# Patient Record
Sex: FEMALE | Race: ASIAN | NOT HISPANIC OR LATINO | URBAN - METROPOLITAN AREA
[De-identification: names, ages, dates, MRNs, and addresses within clinical notes are randomized per-mention and may not be internally consistent; named-entity substitution may affect disease eponyms.]

---

## 2017-03-31 ENCOUNTER — EMERGENCY (EMERGENCY)
Facility: HOSPITAL | Age: 22
LOS: 1 days | Discharge: HOME | End: 2017-03-31
Attending: EMERGENCY MEDICINE | Admitting: EMERGENCY MEDICINE
Payer: COMMERCIAL

## 2017-03-31 VITALS
DIASTOLIC BLOOD PRESSURE: 72 MMHG | HEART RATE: 79 BPM | SYSTOLIC BLOOD PRESSURE: 117 MMHG | OXYGEN SATURATION: 100 % | RESPIRATION RATE: 16 BRPM | TEMPERATURE: 99 F

## 2017-03-31 VITALS
SYSTOLIC BLOOD PRESSURE: 106 MMHG | OXYGEN SATURATION: 100 % | DIASTOLIC BLOOD PRESSURE: 65 MMHG | HEART RATE: 89 BPM | TEMPERATURE: 98 F | RESPIRATION RATE: 16 BRPM

## 2017-03-31 DIAGNOSIS — Z33.2 ENCOUNTER FOR ELECTIVE TERMINATION OF PREGNANCY: ICD-10-CM

## 2017-03-31 PROCEDURE — 99282 EMERGENCY DEPT VISIT SF MDM: CPT

## 2017-03-31 RX ORDER — ONDANSETRON 8 MG/1
4 TABLET, FILM COATED ORAL ONCE
Qty: 0 | Refills: 0 | Status: DISCONTINUED | OUTPATIENT
Start: 2017-03-31 | End: 2017-04-04

## 2017-03-31 NOTE — ED ADULT TRIAGE NOTE - CHIEF COMPLAINT QUOTE
Patient states she took mifeprex which was prescribed by a [facility] clinic and she wants to know if it can be reversed.  Denies abd pain.  Denies n/v/d/f.  No complaints.  Denies vaginal bleeding.

## 2017-03-31 NOTE — ED PROVIDER NOTE - MEDICAL DECISION MAKING DETAILS
s/p misoprostol for elective TOP, now with second thoughts.  discussed with patient that misoprostol cannot be reversed. explained that misoprostol is not guaranteed to work and that she will need to discuss further with her gynecologist.  I emphasized that she is still young enough to have future pregnancies.  She agrees to discuss with her GYN tomorrow.  No SI/HI/AH/VH. s/p misoprostol for elective TOP, now with second thoughts.  discussed with patient that misoprostol cannot be reversed. explained that misoprostol is not guaranteed to work and that she will need to discuss further with her gynecologist.  I emphasized that she is still young enough to have future pregnancies.  She agrees to discuss with her GYN tomorrow.

## 2017-03-31 NOTE — ED PROVIDER NOTE - OBJECTIVE STATEMENT
pt is 9 wks pregnant with first pregnancy. Had misoprostol yesterday for TOP, but today is having second thoughts.  She came to ED to ask if medication can be reversed. she had some cramping last night, but none today, and is not having any bleeding.  no fever or chills but mild nausea.  no abdominal pain.

## 2017-03-31 NOTE — ED PROVIDER NOTE - PHYSICAL EXAMINATION
GEN: awake, alert, NAD  HEAD: NC/AT  EYES: Clear, Pupils equal and round.  ENT: Airway patent; no rhinorrhea  PULM: Lungs clear  CV: RRR S1S2  GI: Abd soft, nontender  MSK: GUTIERREZ without difficulty  SKIN: normal color and turgor, no rash  NEURO: Alert, oriented. No gross abnormalities.  PSYCH: Says she is a little sad but understands that there is a chance that the misoprostol may not have aborted the pregnancy, and that she is young and could become pregnant again for years to come.  Denies SI/HI/AH/VH.  Does not appear to be at risk of harm to self.

## 2017-07-11 ENCOUNTER — EMERGENCY (EMERGENCY)
Facility: HOSPITAL | Age: 22
LOS: 1 days | Discharge: HOME | End: 2017-07-11
Attending: EMERGENCY MEDICINE | Admitting: EMERGENCY MEDICINE
Payer: COMMERCIAL

## 2017-07-11 PROCEDURE — 93010 ELECTROCARDIOGRAM REPORT: CPT

## 2017-07-11 PROCEDURE — 99284 EMERGENCY DEPT VISIT MOD MDM: CPT | Mod: 25

## 2017-07-12 VITALS
SYSTOLIC BLOOD PRESSURE: 117 MMHG | TEMPERATURE: 98 F | OXYGEN SATURATION: 100 % | RESPIRATION RATE: 16 BRPM | HEART RATE: 75 BPM | DIASTOLIC BLOOD PRESSURE: 67 MMHG

## 2017-07-12 VITALS
HEART RATE: 64 BPM | DIASTOLIC BLOOD PRESSURE: 67 MMHG | TEMPERATURE: 98 F | RESPIRATION RATE: 14 BRPM | SYSTOLIC BLOOD PRESSURE: 117 MMHG

## 2017-07-12 NOTE — ED ADULT TRIAGE NOTE - CHIEF COMPLAINT QUOTE
Pt c/o dizziness, lightheadedness and palpitations. Was seen at [facility] this afternoon with same complaints. Per pt's mother, pt has hx of depression and anxiety and is not sure if it is related.

## 2017-07-12 NOTE — ED PROVIDER NOTE - ATTENDING CONTRIBUTION TO CARE
pt with palps and anxiety at home.  Already seen at one OSH.  Has been on meds before for psych and anxiety such as this.  No longer on those meds.  Here with a unremarkable EKG without arrythmia.  Not consistent with an ACS.  PE in ED is normal from CV and resp standpoint

## 2017-07-12 NOTE — ED ADULT NURSE NOTE - OBJECTIVE STATEMENT
Patient received in room #23 c/o vasal vagal episode. A&OX3. Patient denies hitting her head. Patient reports she was seen in [facility] and was told all test results were negative. Patient c/o weakness and palpitations. Patient reports hx. anxiety. Denies SI/HI. Patient appears in nad, respirations even and unlabored. Vs as noted. Will monitor.

## 2017-07-12 NOTE — ED PROVIDER NOTE - OBJECTIVE STATEMENT
22 year old female with history of anxiety and depression, in with lightheadedness x 1 day, palpitations x 1 week, described as going fast. 22 year old female with history of anxiety and depression, in with lightheadedness x 1 day, palpitations x 1 week, described as heartrate going fast. Seen earlier in the day at [facility] ED, had en ekg done that was normal, sent home.  States she was going to bed and again noted the symptoms.  Normally able to control these symptoms when she relaxes, but was unable to tonite so came back to the ED.

## 2017-07-12 NOTE — ED PROVIDER NOTE - MEDICAL DECISION MAKING DETAILS
22F in with palpitations, lightheadedness, likely anxiety, no need for labs, ekg normal, will dc home with followup and recommend psychiatry evaluation

## 2017-07-12 NOTE — ED ADULT NURSE NOTE - CHIEF COMPLAINT QUOTE
Pt c/o dizziness, lightheadedness and palpitations. Was seen at [facility] this afternoon with same complaints. Per pt's mother, pt has hx of depression and anxiety and is not sure if it is related.

## 2017-08-06 ENCOUNTER — EMERGENCY (EMERGENCY)
Facility: HOSPITAL | Age: 22
LOS: 1 days | Discharge: HOME | End: 2017-08-06
Attending: EMERGENCY MEDICINE | Admitting: EMERGENCY MEDICINE
Payer: COMMERCIAL

## 2017-08-06 VITALS
OXYGEN SATURATION: 100 % | TEMPERATURE: 99 F | SYSTOLIC BLOOD PRESSURE: 102 MMHG | RESPIRATION RATE: 16 BRPM | DIASTOLIC BLOOD PRESSURE: 61 MMHG | HEART RATE: 72 BPM

## 2017-08-06 LAB
ALBUMIN SERPL ELPH-MCNC: 4.9 G/DL — SIGNIFICANT CHANGE UP (ref 3.3–5)
ALP SERPL-CCNC: 62 U/L — SIGNIFICANT CHANGE UP (ref 40–120)
ALT FLD-CCNC: 11 U/L — SIGNIFICANT CHANGE UP (ref 4–33)
APPEARANCE UR: CLEAR — SIGNIFICANT CHANGE UP
AST SERPL-CCNC: 17 U/L — SIGNIFICANT CHANGE UP (ref 4–32)
BASOPHILS # BLD AUTO: 0.07 K/UL — SIGNIFICANT CHANGE UP (ref 0–0.2)
BASOPHILS NFR BLD AUTO: 0.6 % — SIGNIFICANT CHANGE UP (ref 0–2)
BILIRUB SERPL-MCNC: 0.2 MG/DL — SIGNIFICANT CHANGE UP (ref 0.2–1.2)
BILIRUB UR-MCNC: NEGATIVE — SIGNIFICANT CHANGE UP
BLOOD UR QL VISUAL: NEGATIVE — SIGNIFICANT CHANGE UP
BUN SERPL-MCNC: 10 MG/DL — SIGNIFICANT CHANGE UP (ref 7–23)
CALCIUM SERPL-MCNC: 9.8 MG/DL — SIGNIFICANT CHANGE UP (ref 8.4–10.5)
CHLORIDE SERPL-SCNC: 103 MMOL/L — SIGNIFICANT CHANGE UP (ref 98–107)
CO2 SERPL-SCNC: 23 MMOL/L — SIGNIFICANT CHANGE UP (ref 22–31)
COLOR SPEC: YELLOW — SIGNIFICANT CHANGE UP
CREAT SERPL-MCNC: 0.75 MG/DL — SIGNIFICANT CHANGE UP (ref 0.5–1.3)
EOSINOPHIL # BLD AUTO: 0.38 K/UL — SIGNIFICANT CHANGE UP (ref 0–0.5)
EOSINOPHIL NFR BLD AUTO: 3.4 % — SIGNIFICANT CHANGE UP (ref 0–6)
GLUCOSE SERPL-MCNC: 85 MG/DL — SIGNIFICANT CHANGE UP (ref 70–99)
GLUCOSE UR-MCNC: NEGATIVE — SIGNIFICANT CHANGE UP
HCT VFR BLD CALC: 45.6 % — HIGH (ref 34.5–45)
HGB BLD-MCNC: 14.7 G/DL — SIGNIFICANT CHANGE UP (ref 11.5–15.5)
IMM GRANULOCYTES # BLD AUTO: 0.02 # — SIGNIFICANT CHANGE UP
IMM GRANULOCYTES NFR BLD AUTO: 0.2 % — SIGNIFICANT CHANGE UP (ref 0–1.5)
KETONES UR-MCNC: NEGATIVE — SIGNIFICANT CHANGE UP
LEUKOCYTE ESTERASE UR-ACNC: NEGATIVE — SIGNIFICANT CHANGE UP
LYMPHOCYTES # BLD AUTO: 4.52 K/UL — HIGH (ref 1–3.3)
LYMPHOCYTES # BLD AUTO: 40.5 % — SIGNIFICANT CHANGE UP (ref 13–44)
MCHC RBC-ENTMCNC: 28 PG — SIGNIFICANT CHANGE UP (ref 27–34)
MCHC RBC-ENTMCNC: 32.2 % — SIGNIFICANT CHANGE UP (ref 32–36)
MCV RBC AUTO: 86.9 FL — SIGNIFICANT CHANGE UP (ref 80–100)
MONOCYTES # BLD AUTO: 0.65 K/UL — SIGNIFICANT CHANGE UP (ref 0–0.9)
MONOCYTES NFR BLD AUTO: 5.8 % — SIGNIFICANT CHANGE UP (ref 2–14)
MUCOUS THREADS # UR AUTO: SIGNIFICANT CHANGE UP
NEUTROPHILS # BLD AUTO: 5.51 K/UL — SIGNIFICANT CHANGE UP (ref 1.8–7.4)
NEUTROPHILS NFR BLD AUTO: 49.5 % — SIGNIFICANT CHANGE UP (ref 43–77)
NITRITE UR-MCNC: NEGATIVE — SIGNIFICANT CHANGE UP
NRBC # FLD: 0 — SIGNIFICANT CHANGE UP
PH UR: 5.5 — SIGNIFICANT CHANGE UP (ref 4.6–8)
PLATELET # BLD AUTO: 310 K/UL — SIGNIFICANT CHANGE UP (ref 150–400)
PMV BLD: 10.5 FL — SIGNIFICANT CHANGE UP (ref 7–13)
POTASSIUM SERPL-MCNC: 4.1 MMOL/L — SIGNIFICANT CHANGE UP (ref 3.5–5.3)
POTASSIUM SERPL-SCNC: 4.1 MMOL/L — SIGNIFICANT CHANGE UP (ref 3.5–5.3)
PROT SERPL-MCNC: 8 G/DL — SIGNIFICANT CHANGE UP (ref 6–8.3)
PROT UR-MCNC: NEGATIVE — SIGNIFICANT CHANGE UP
RBC # BLD: 5.25 M/UL — HIGH (ref 3.8–5.2)
RBC # FLD: 12.7 % — SIGNIFICANT CHANGE UP (ref 10.3–14.5)
RBC CASTS # UR COMP ASSIST: SIGNIFICANT CHANGE UP (ref 0–?)
SODIUM SERPL-SCNC: 142 MMOL/L — SIGNIFICANT CHANGE UP (ref 135–145)
SP GR SPEC: 1.02 — SIGNIFICANT CHANGE UP (ref 1–1.03)
SQUAMOUS # UR AUTO: SIGNIFICANT CHANGE UP
TSH SERPL-MCNC: 4.15 UIU/ML — SIGNIFICANT CHANGE UP (ref 0.27–4.2)
UROBILINOGEN FLD QL: NORMAL E.U. — SIGNIFICANT CHANGE UP (ref 0.1–0.2)
WBC # BLD: 11.15 K/UL — HIGH (ref 3.8–10.5)
WBC # FLD AUTO: 11.15 K/UL — HIGH (ref 3.8–10.5)
WBC UR QL: SIGNIFICANT CHANGE UP (ref 0–?)

## 2017-08-06 PROCEDURE — 99283 EMERGENCY DEPT VISIT LOW MDM: CPT | Mod: 25

## 2017-08-06 PROCEDURE — 93010 ELECTROCARDIOGRAM REPORT: CPT | Mod: 59

## 2017-08-06 NOTE — ED PROVIDER NOTE - OBJECTIVE STATEMENT
Pt is a 21 y/o F presenting to the ED with c/o intermittent palpitations over the last month. Pt reports having these episodes of palpitations associated with near syncope. She notes that she had an elective  in 2017 and admits her sxs began shortly after that. Additionally reports to multiple stressors in her life. Pt was seen at [facility] ED regarding the same sxs and dx'd with anxiety induced vasovagal syncope. She reports that today, her episode of palpitations persisted longer than usual, prompting her visit to the ED today. Pt admits that she is so fixated on the reoccurrence of sxs that it interferes with her ADLs. Mother states the sxs have become so severe, the pt no longer sleeps alone and has to sleep in the company of her. She has a normal appetite. Denies AH/VH, SI/HI, CP, SOB, calf swelling, and any other sxs. Pt admits that she is very hypersensitive about her bodily sxs and she is consistently worried that her physical sxs are not linked to anxiety and she is missing a dx. Denies drug use. She is a former smoker. Pt is a 21 y/o F presenting to the ED with c/o intermittent palpitations over the last month. Pt reports having these episodes of palpitations associated with near syncope. She notes that she had an elective  in 2017 and admits her sxs began shortly after that. Additionally reports to multiple stressors in her life. Pt was seen at [facility] ED regarding the same sxs and dx'd with anxiety induced vasovagal syncope. She reports that today, her episode of palpitations persisted longer than usual, prompting her visit to the ED today. Pt admits that she is so fixated on the reoccurrence of sxs that it interferes with her ADLs. Mother states the sxs have become so severe, the pt no longer sleeps alone and has to sleep in the company of her. She has a normal appetite. Denies AH/VH, SI/HI, CP, SOB, calf swelling, and any other sxs. Pt admits that she is very hypersensitive about her bodily sxs and she is consistently worried that her physical sxs are not linked to anxiety and she is missing a dx. Denies drug use. She is a former smoker.  Attending - Agree with above.  I evaluated patient myself.  21 y/o F with mother at bedside, reports hx of anxiety and depression, on Zoloft and Buspirone in past but stopped approx 1 year ago because prefers not to take medications.  Had not returned to psychiatrist.  Reports for past month has been having panic attack episodes where she feels palpitations, nausea, and impending sense of doom.  Has been sleeping with mother for fear of return.  To ED , [facility] returned.  Currently feels well, but concerned will return.  Denies AH/VH.  Denies SI/HI.  Mother concerned that symptoms from anemia as patient has had in past from heavy menses. Pt is a 21 y/o F presenting to the ED with c/o intermittent palpitations over the last month. Pt reports having these episodes of palpitations associated with near syncope. She notes that she had an elective  in 2017 and admits her sxs began shortly after that. Additionally reports to multiple stressors in her life. Pt was seen at [facility] ED regarding the same sxs and dx'd with anxiety induced vasovagal syncope. She reports that today, her episode of palpitations persisted longer than usual, prompting her visit to the ED today. Pt admits that she is so fixated on the reoccurrence of sxs that it interferes with her ADLs. Mother states the sxs have become so severe, the pt no longer sleeps alone and has to sleep in the company of her mother. She has a normal appetite. Denies AH/VH, SI/HI, CP, SOB, calf swelling, and any other sxs. Pt admits that she is very hypersensitive about her bodily sxs and she is consistently worried that her physical sxs are not linked to anxiety and she is missing a dx. Denies drug use. She is a former smoker.  Attending - Agree with above.  I evaluated patient myself.  21 y/o F with mother at bedside, reports hx of anxiety and depression, on Zoloft and Buspirone in past but stopped approx 1 year ago because prefers not to take medications.  Had not returned to psychiatrist.  Reports for past month has been having panic attack episodes where she feels palpitations, nausea, and impending sense of doom.  Has been sleeping with mother for fear of return.  To ED , [facility] returned.  Currently feels well, but concerned will return.  Denies AH/VH.  Denies SI/HI.  Mother concerned that symptoms from anemia as patient has had in past from heavy menses.

## 2017-08-06 NOTE — ED ADULT NURSE NOTE - CHIEF COMPLAINT QUOTE
onset ~ 1month.  h/o anxiety but per pt, feels different. felt heart pounding , felt dizzy, hands cold.  no meds. last episode ~ 6pm.  spoke to psyche: to have ekg and speak to psyche to start. pt denies homicidal/suicidal ideations

## 2017-08-06 NOTE — ED ADULT TRIAGE NOTE - CHIEF COMPLAINT QUOTE
onset ~ 1month.  h/o anxiety but per pt, feels different. felt heart pounding , felt dizzy, hands cold.  no meds. last episode ~ 6pm onset ~ 1month.  h/o anxiety but per pt, feels different. felt heart pounding , felt dizzy, hands cold.  no meds. last episode ~ 6pm.  spoke to psyche: to have ekg and speak to psyche to start. pt denies homicidal/suicidal ideations

## 2017-08-06 NOTE — ED PROVIDER NOTE - MEDICAL DECISION MAKING DETAILS
21 yo F with episodes of palpitations and near syncope. Has been diagnosed with vasovagal syncope in the past but unknown if related to anxiety. No SI/HI. Plan to obtain EKG and labs to r/o anemia vs hypothyroidism. Will call psych when labs are wnl.

## 2017-08-06 NOTE — ED PROVIDER NOTE - PLAN OF CARE
See your primary care doctor within 24-48 hours. See your psychiatrist this week for further evaluation, bring copies of all reports with you. Take Xanax 1 tab every 6-8 hours as needed for anxiety, caution no driving causes drowsiness. Return to the ER for worsening symptoms or any other concerns.

## 2017-08-06 NOTE — ED PROVIDER NOTE - PHYSICAL EXAMINATION
ATTENDING PHYSICAL EXAM DR. Page ***GEN - NAD; well appearing; A+O x3 ***HEAD - NC/AT ***EYES/NOSE - PERRL, EOMI, mucous membranes moist, no discharge ***THROAT: Oral cavity and pharynx normal. No inflammation, swelling, exudate, or lesions.  ***NECK: Neck supple, non-tender without lymphadenopathy, no masses, no JVD.   ***PULMONARY - CTA b/l, symmetric breath sounds. ***CARDIAC -s1s2, RRR, no M,R,G  ***ABDOMEN - +BS, ND, NT, soft, no guarding, no rebound, no masses   ***BACK - no CVA tenderness, Normal  spine ***EXTREMITIES - symmetric pulses, 2+ dp, capillary refill < 2 seconds, no clubbing, no cyanosis, no edema ***SKIN - no rash or bruising   ***NEUROLOGIC - alert, CN 2-12 intact, reflexes nl, sensation nl, coordination nl, finger to nose nl, romberg negative, motor 5/5 RUE/LUE/RLE/LLE/EHL/Plantar flexion, no pronator drift, gait nl, ***PSYCH - insight and judgment nl, memory nl, affect nl, thought nl

## 2017-08-06 NOTE — ED ADULT NURSE NOTE - OBJECTIVE STATEMENT
Pt 22y female presents to ED c/o feeling different and palpitations, pt has hx of anxiety, and appt to see pmd this week. Pt aaox3 and ambulatory. Pt appears anxious, NAD observed, denies pain, pt resting on stretcher with family at bedside. Pt denies chest pain but feels "heart racing", pt denies sob, fever, chills, ha, n/v/d, abd pain.

## 2017-08-06 NOTE — ED PROVIDER NOTE - CARE PLAN
Principal Discharge DX:	Panic attack  Instructions for follow-up, activity and diet:	See your primary care doctor within 24-48 hours. See your psychiatrist this week for further evaluation, bring copies of all reports with you. Take Xanax 1 tab every 6-8 hours as needed for anxiety, caution no driving causes drowsiness. Return to the ER for worsening symptoms or any other concerns.

## 2017-08-06 NOTE — ED PROVIDER NOTE - PROGRESS NOTE DETAILS
JUAN Erickson: Labs wnl, pt seeing her PMD tomorrow. Has a psychiatrist to f/u w/ for further management. No need for psych eval now, no SI/HI.

## 2017-08-07 RX ORDER — ALPRAZOLAM 0.25 MG
1 TABLET ORAL
Qty: 9 | Refills: 0 | OUTPATIENT
Start: 2017-08-07 | End: 2017-08-10

## 2017-08-09 ENCOUNTER — EMERGENCY (EMERGENCY)
Facility: HOSPITAL | Age: 22
LOS: 1 days | Discharge: HOME | End: 2017-08-09
Attending: EMERGENCY MEDICINE | Admitting: EMERGENCY MEDICINE
Payer: COMMERCIAL

## 2017-08-09 VITALS
HEART RATE: 82 BPM | OXYGEN SATURATION: 100 % | DIASTOLIC BLOOD PRESSURE: 68 MMHG | TEMPERATURE: 99 F | SYSTOLIC BLOOD PRESSURE: 100 MMHG | RESPIRATION RATE: 16 BRPM

## 2017-08-09 PROCEDURE — 99283 EMERGENCY DEPT VISIT LOW MDM: CPT

## 2017-08-09 RX ORDER — FAMOTIDINE 10 MG/ML
1 INJECTION INTRAVENOUS
Qty: 28 | Refills: 0 | OUTPATIENT
Start: 2017-08-09 | End: 2017-08-23

## 2017-08-09 NOTE — ED PROVIDER NOTE - MEDICAL DECISION MAKING DETAILS
pt with sxs of mild gastroenteritis  abd exam unremarkable  RUQ US performed (resident felt tenderness there) negative  pain improved with pepcid

## 2017-08-09 NOTE — ED PROVIDER NOTE - OBJECTIVE STATEMENT
pt presenting with diarrhea (soft stools) vomiting x 1 epigastric pain worse after eating  also noted on empty stomah  also mild left sided abd pain  no radiation  no fever  no urinary sxs  no recent travel  no new medications  sx occurring over 1 week pt presenting with diarrhea (soft stools) vomiting x 1 epigastric pain worse after eating  also noted on empty stomach  also mild left sided abd pain  no radiation  no fever  no urinary sxs  no recent travel  no new medications  sx occurring over 1 week

## 2017-08-10 ENCOUNTER — EMERGENCY (EMERGENCY)
Facility: HOSPITAL | Age: 22
LOS: 1 days | Discharge: HOME | End: 2017-08-10
Admitting: EMERGENCY MEDICINE
Payer: COMMERCIAL

## 2017-08-10 VITALS
SYSTOLIC BLOOD PRESSURE: 126 MMHG | TEMPERATURE: 98 F | HEART RATE: 69 BPM | DIASTOLIC BLOOD PRESSURE: 72 MMHG | RESPIRATION RATE: 15 BRPM | OXYGEN SATURATION: 100 %

## 2017-08-10 PROCEDURE — 99283 EMERGENCY DEPT VISIT LOW MDM: CPT

## 2017-08-10 NOTE — ED ADULT TRIAGE NOTE - CHIEF COMPLAINT QUOTE
Pt c/o panic attack this evening at around 8pm.  Pt reports her body is now relaxed, but still feels like her "mind is racing."  Spoke with  NP, pt to be seen in .

## 2017-08-10 NOTE — ED PROVIDER NOTE - OBJECTIVE STATEMENT
23 y/o F hx Anxiety Disorder, Depression  Denies falling,  punching or kicking any objects. Denies pain, SOB, fever, chills, chest/abdominal discomfort. Denies SIHI/AH/VH. Denies use alcohol or illicit drugs. 23 y/o F hx Anxiety Disorder, Depression presents  to ER w c/o "intermittent feelings of anxiousness". States that she's compliant with  medication that she started 1 week ago. Denies falling,  punching or kicking any objects. Denies pain, SOB, fever, chills, chest/abdominal discomfort. Denies SI/HI/AH/VH. Denies use alcohol or illicit drugs.  LNMP - 7/2017

## 2017-08-10 NOTE — ED ADULT NURSE NOTE - OBJECTIVE STATEMENT
BIb mom from home for anxious feelings. Pt d/c from ed for similar presentation with xanax rx. Pt is awake, a&ox3, denies s/i h/i or a/v/h. Pt reports ongoing anxious feelings since d/c and has not taken xanax because she is "afraid it is addicting".

## 2017-08-10 NOTE — ED PROVIDER NOTE - MEDICAL DECISION MAKING DETAILS
21 y/o F hx Anxiety Disorder, Depression  Medical evaluation performed. There is no clinical evidence of intoxication or any acute medical problem requiring immediate intervention. Patient is awaiting psychiatric consultation. Final disposition will be determined by psychiatrist. 21 y/o F hx Anxiety Disorder, Depression  Medical evaluation performed. There is no clinical evidence of intoxication or any acute medical problem requiring immediate intervention.  Referral to [facility] provided.

## 2017-08-11 ENCOUNTER — OUTPATIENT (OUTPATIENT)
Dept: OUTPATIENT SERVICES | Facility: HOSPITAL | Age: 22
LOS: 1 days | Discharge: STILL ADMITTED OR ADMINISTRATIVE | End: 2017-08-11
Payer: COMMERCIAL

## 2017-08-11 PROCEDURE — 90792 PSYCH DIAG EVAL W/MED SRVCS: CPT

## 2017-08-14 DIAGNOSIS — F41.0 PANIC DISORDER [EPISODIC PAROXYSMAL ANXIETY]: ICD-10-CM

## 2019-06-02 ENCOUNTER — EMERGENCY (EMERGENCY)
Facility: HOSPITAL | Age: 24
LOS: 1 days | Discharge: HOME | End: 2019-06-02
Attending: EMERGENCY MEDICINE | Admitting: EMERGENCY MEDICINE
Payer: COMMERCIAL

## 2019-06-02 VITALS
OXYGEN SATURATION: 97 % | RESPIRATION RATE: 16 BRPM | SYSTOLIC BLOOD PRESSURE: 106 MMHG | DIASTOLIC BLOOD PRESSURE: 73 MMHG | TEMPERATURE: 98 F | HEART RATE: 77 BPM

## 2019-06-02 LAB
ALBUMIN SERPL ELPH-MCNC: 5 G/DL — SIGNIFICANT CHANGE UP (ref 3.3–5)
ALP SERPL-CCNC: 43 U/L — SIGNIFICANT CHANGE UP (ref 40–120)
ALT FLD-CCNC: 11 U/L — SIGNIFICANT CHANGE UP (ref 4–33)
ANION GAP SERPL CALC-SCNC: 14 MMO/L — SIGNIFICANT CHANGE UP (ref 7–14)
APPEARANCE UR: CLEAR — SIGNIFICANT CHANGE UP
AST SERPL-CCNC: 24 U/L — SIGNIFICANT CHANGE UP (ref 4–32)
BASOPHILS # BLD AUTO: 0.06 K/UL — SIGNIFICANT CHANGE UP (ref 0–0.2)
BASOPHILS NFR BLD AUTO: 0.7 % — SIGNIFICANT CHANGE UP (ref 0–2)
BILIRUB SERPL-MCNC: 0.5 MG/DL — SIGNIFICANT CHANGE UP (ref 0.2–1.2)
BILIRUB UR-MCNC: NEGATIVE — SIGNIFICANT CHANGE UP
BLOOD UR QL VISUAL: NEGATIVE — SIGNIFICANT CHANGE UP
BUN SERPL-MCNC: 7 MG/DL — SIGNIFICANT CHANGE UP (ref 7–23)
CALCIUM SERPL-MCNC: 10.2 MG/DL — SIGNIFICANT CHANGE UP (ref 8.4–10.5)
CHLORIDE SERPL-SCNC: 103 MMOL/L — SIGNIFICANT CHANGE UP (ref 98–107)
CO2 SERPL-SCNC: 22 MMOL/L — SIGNIFICANT CHANGE UP (ref 22–31)
COLOR SPEC: SIGNIFICANT CHANGE UP
CREAT SERPL-MCNC: 0.66 MG/DL — SIGNIFICANT CHANGE UP (ref 0.5–1.3)
EOSINOPHIL # BLD AUTO: 0.21 K/UL — SIGNIFICANT CHANGE UP (ref 0–0.5)
EOSINOPHIL NFR BLD AUTO: 2.6 % — SIGNIFICANT CHANGE UP (ref 0–6)
GLUCOSE SERPL-MCNC: 110 MG/DL — HIGH (ref 70–99)
GLUCOSE UR-MCNC: NEGATIVE — SIGNIFICANT CHANGE UP
HCT VFR BLD CALC: 46 % — HIGH (ref 34.5–45)
HGB BLD-MCNC: 14.9 G/DL — SIGNIFICANT CHANGE UP (ref 11.5–15.5)
IMM GRANULOCYTES NFR BLD AUTO: 0.2 % — SIGNIFICANT CHANGE UP (ref 0–1.5)
KETONES UR-MCNC: NEGATIVE — SIGNIFICANT CHANGE UP
LEUKOCYTE ESTERASE UR-ACNC: NEGATIVE — SIGNIFICANT CHANGE UP
LIDOCAIN IGE QN: 36 U/L — SIGNIFICANT CHANGE UP (ref 7–60)
LYMPHOCYTES # BLD AUTO: 2 K/UL — SIGNIFICANT CHANGE UP (ref 1–3.3)
LYMPHOCYTES # BLD AUTO: 24.4 % — SIGNIFICANT CHANGE UP (ref 13–44)
MCHC RBC-ENTMCNC: 28.4 PG — SIGNIFICANT CHANGE UP (ref 27–34)
MCHC RBC-ENTMCNC: 32.4 % — SIGNIFICANT CHANGE UP (ref 32–36)
MCV RBC AUTO: 87.8 FL — SIGNIFICANT CHANGE UP (ref 80–100)
MONOCYTES # BLD AUTO: 0.41 K/UL — SIGNIFICANT CHANGE UP (ref 0–0.9)
MONOCYTES NFR BLD AUTO: 5 % — SIGNIFICANT CHANGE UP (ref 2–14)
NEUTROPHILS # BLD AUTO: 5.49 K/UL — SIGNIFICANT CHANGE UP (ref 1.8–7.4)
NEUTROPHILS NFR BLD AUTO: 67.1 % — SIGNIFICANT CHANGE UP (ref 43–77)
NITRITE UR-MCNC: NEGATIVE — SIGNIFICANT CHANGE UP
NRBC # FLD: 0 K/UL — SIGNIFICANT CHANGE UP (ref 0–0)
PH UR: 6 — SIGNIFICANT CHANGE UP (ref 5–8)
PLATELET # BLD AUTO: 299 K/UL — SIGNIFICANT CHANGE UP (ref 150–400)
PMV BLD: 10.9 FL — SIGNIFICANT CHANGE UP (ref 7–13)
POTASSIUM SERPL-MCNC: 4.5 MMOL/L — SIGNIFICANT CHANGE UP (ref 3.5–5.3)
POTASSIUM SERPL-SCNC: 4.5 MMOL/L — SIGNIFICANT CHANGE UP (ref 3.5–5.3)
PROT SERPL-MCNC: 8.6 G/DL — HIGH (ref 6–8.3)
PROT UR-MCNC: NEGATIVE — SIGNIFICANT CHANGE UP
RBC # BLD: 5.24 M/UL — HIGH (ref 3.8–5.2)
RBC # FLD: 12.9 % — SIGNIFICANT CHANGE UP (ref 10.3–14.5)
SODIUM SERPL-SCNC: 139 MMOL/L — SIGNIFICANT CHANGE UP (ref 135–145)
SP GR SPEC: 1.01 — SIGNIFICANT CHANGE UP (ref 1–1.04)
UROBILINOGEN FLD QL: NORMAL — SIGNIFICANT CHANGE UP
WBC # BLD: 8.19 K/UL — SIGNIFICANT CHANGE UP (ref 3.8–10.5)
WBC # FLD AUTO: 8.19 K/UL — SIGNIFICANT CHANGE UP (ref 3.8–10.5)

## 2019-06-02 PROCEDURE — 99284 EMERGENCY DEPT VISIT MOD MDM: CPT

## 2019-06-02 RX ORDER — KETOROLAC TROMETHAMINE 30 MG/ML
15 SYRINGE (ML) INJECTION ONCE
Refills: 0 | Status: DISCONTINUED | OUTPATIENT
Start: 2019-06-02 | End: 2019-06-02

## 2019-06-02 RX ORDER — SODIUM CHLORIDE 9 MG/ML
1000 INJECTION INTRAMUSCULAR; INTRAVENOUS; SUBCUTANEOUS ONCE
Refills: 0 | Status: COMPLETED | OUTPATIENT
Start: 2019-06-02 | End: 2019-06-02

## 2019-06-02 RX ADMIN — SODIUM CHLORIDE 2000 MILLILITER(S): 9 INJECTION INTRAMUSCULAR; INTRAVENOUS; SUBCUTANEOUS at 10:24

## 2019-06-02 RX ADMIN — Medication 15 MILLIGRAM(S): at 10:20

## 2019-06-02 NOTE — ED PROVIDER NOTE - PROGRESS NOTE DETAILS
Yajaira MISHRA: Patient feeling better, tolerating PO. Labs discussed in detail and patient provided with a copy. Return instructions discussed in detail - return for worsening pain, localization of pain to right lower abdomen, new symptoms such as vomiting, fever.

## 2019-06-02 NOTE — ED PROVIDER NOTE - CHIEF COMPLAINT
The patient is a 24y Female complaining of The patient is a 24y Female complaining of abdominal pain.

## 2019-06-02 NOTE — ED ADULT NURSE NOTE - NSIMPLEMENTINTERV_GEN_ALL_ED
Implemented All Fall Risk Interventions:  [place] to call system. Call bell, personal items and telephone within reach. Instruct patient to call for assistance. Room bathroom lighting operational. Non-slip footwear when patient is off stretcher. Physically safe environment: no spills, clutter or unnecessary equipment. Stretcher in lowest position, wheels locked, appropriate side rails in place. Provide visual cue, wrist band, yellow gown, etc. Monitor gait and stability. Monitor for mental status changes and reorient to person, place, and time. Review medications for side effects contributing to fall risk. Reinforce activity limits and safety measures with patient and family.

## 2019-06-02 NOTE — ED PROVIDER NOTE - NSFOLLOWUPINSTRUCTIONS_ED_ALL_ED_FT
Please follow up with your doctor. Return for worsening pain, localization of pain to right lower abdomen, new symptoms such as vomiting, fever. Take over the counter motrin or tylenol for pain as needed and if uncontrolled, come back to the emergency department.

## 2019-06-02 NOTE — ED PROVIDER NOTE - CLINICAL SUMMARY MEDICAL DECISION MAKING FREE TEXT BOX
abd pain, sharp, intermittent, points to periumbilical region radiating to back. ROS positive for nausea, subjective fevers. Denies urinary/ vaginal symptoms. Exam w/o focal findings. No RLQ tenderness. Will check labs, u/a. Pt did not try any pain medication - will give pain meds and reassess. abd pain, sharp, intermittent, points to periumbilical region radiating to back. ROS positive for nausea, subjective fevers. Denies urinary/ vaginal symptoms. Exam w/o focal findings. No RLQ tenderness. Will check labs, u/a. Pt did not try any pain medication - will give pain meds and reassess. Pain improved, labs wnl, patient to be discharged with strict return precautions.

## 2019-06-02 NOTE — ED ADULT TRIAGE NOTE - CHIEF COMPLAINT QUOTE
Pt c/o sharp epigastric pain with SOB since last night, states she was unable to sleep. Reports similar episode 2 years ago and states "they didn't find anything".

## 2019-06-02 NOTE — ED PROVIDER NOTE - OBJECTIVE STATEMENT
Patient is a 23 yo F with history of anxiety here for abdominal pain described as sharp, intermittent, since last night. She states he was unable to sleep and woke up at 2 AM because of the pain. She reports recently having a cold, subjectively having fevers a few days ago. She states she feels a little nauseated with pain. No vomiting. No diarrhea. LBM this morning. No urinary symptoms. Denies blood in stool. She states she feels anxious and a little short of breath but denies having chest pain with deep breathing. A similar episode happened a few years ago without any known etiology. No travel. Denies OCP use.

## 2019-06-03 ENCOUNTER — TRANSCRIPTION ENCOUNTER (OUTPATIENT)
Age: 24
End: 2019-06-03

## 2021-02-06 ENCOUNTER — EMERGENCY (EMERGENCY)
Facility: HOSPITAL | Age: 26
LOS: 1 days | Discharge: HOME | End: 2021-02-06
Attending: EMERGENCY MEDICINE | Admitting: EMERGENCY MEDICINE
Payer: COMMERCIAL

## 2021-02-06 VITALS
DIASTOLIC BLOOD PRESSURE: 76 MMHG | RESPIRATION RATE: 17 BRPM | SYSTOLIC BLOOD PRESSURE: 107 MMHG | HEART RATE: 106 BPM | TEMPERATURE: 98 F | OXYGEN SATURATION: 99 % | HEIGHT: 61 IN

## 2021-02-06 PROCEDURE — 99283 EMERGENCY DEPT VISIT LOW MDM: CPT

## 2021-02-06 RX ORDER — METHOCARBAMOL 500 MG/1
2 TABLET, FILM COATED ORAL
Qty: 24 | Refills: 0
Start: 2021-02-06 | End: 2021-02-09

## 2021-02-06 RX ORDER — METHOCARBAMOL 500 MG/1
1500 TABLET, FILM COATED ORAL ONCE
Refills: 0 | Status: DISCONTINUED | OUTPATIENT
Start: 2021-02-06 | End: 2021-02-06

## 2021-02-06 RX ORDER — LIDOCAINE 4 G/100G
1 CREAM TOPICAL ONCE
Refills: 0 | Status: COMPLETED | OUTPATIENT
Start: 2021-02-06 | End: 2021-02-06

## 2021-02-06 RX ORDER — ACETAMINOPHEN 500 MG
975 TABLET ORAL ONCE
Refills: 0 | Status: COMPLETED | OUTPATIENT
Start: 2021-02-06 | End: 2021-02-06

## 2021-02-06 RX ADMIN — LIDOCAINE 1 PATCH: 4 CREAM TOPICAL at 06:14

## 2021-02-06 NOTE — ED ADULT TRIAGE NOTE - HEIGHT IN CM
Ongoing SW/CM Assessment/Plan of Care Note     See SW/CM flowsheets for goals and other objective data.    Patient/Family discharge goal (s):  Goal #1: Communication facilitated  Goal #2: Psychosocial needs assessed  Goal #3: Discharge to other institution(s) arranged    PT Recommendation:       OT Recommendation:       SLP Recommendation:       Disposition:  Planned Discharge Destination: Location not determined at this time    Progress note:   SW following, Kristen Randi from Crisis team called, she was updated on pt condition and that he has GI test today. Pt not likely ready today for d/c.  [facility] hold, will be expiring today, (72 hours).  Kristen will send someone from Crisis team to re-evaluate the pt.  Kristen was advised that Heather Guzman PsyD will be seeing pt today as well to evaluate pt safety.  LILY notes that if Dr. Guzman does find that the pt Chapter hold needs to be extended due to pt safety concerns that she can call Dyllan  at [phone] and talk to Mesfin Rivera.  Kristen reports that pt did have a bed at [place], she was hoping that pt could go to [facility].  SW will await evaluations from Dr Guzman and Estee.  RN updated.  SW will follow and assist.         [phone]

## 2021-02-06 NOTE — ED PROVIDER NOTE - PHYSICAL EXAMINATION
LOS:     VITALS:   T(C): 36.8 (02-06-21 @ 05:23), Max: 36.8 (02-06-21 @ 05:23)  HR: 106 (02-06-21 @ 05:23) (106 - 106)  BP: 107/76 (02-06-21 @ 05:23) (107/76 - 107/76)  RR: 17 (02-06-21 @ 05:23) (17 - 17)  SpO2: 99% (02-06-21 @ 05:23) (99% - 99%)    GENERAL: NAD  HEAD:  Atraumatic, Normocephalic  EYES: EOMI, PERRLA, conjunctiva and sclera clear  Throat: no swelling, erythema, or indication of abscess  Ear: unremarkable  NECK: Supple  CHEST/LUNG: Clear to auscultation bilaterally; Unlabored respirations  HEART: Regular rate and rhythm; No murmurs, rubs, or gallops  NERVOUS SYSTEM:  A&Ox3, no focal deficits   SKIN: No lesions PGY3/MD Courtney.   VITALS: reviewed  GEN: No apparent distress, A & O x 4  HEAD/EYES: NC/AT, PERRL, EOMI, anicteric sclerae, no conjunctival pallor  ENT: mucus membranes moist, oropharynx WNL, trachea midline, no JVD, neck is supple, TM clear, no redness  RESP: lungs CTA with equal breath sounds bilaterally, chest wall nontender and atraumatic  CV: heart with reg rhythm S1, S2, no murmur; distal pulses intact and symmetric bilaterally  ABDOMEN: normoactive bowel sounds, soft, nondistended, nontender,  MSK: extremities atraumatic and nontender, no edema, no asymmetry. the back is without midline or lateral tenderness, there is no spinal deformity or stepoff and the back is ranged painlessly. the neck has no midline tenderness, deformity, or stepoff, and is ranged painlessly.  SKIN: warm, dry, no rash, no bruising, no cyanosis. color appropriate for ethnicity  NEURO: alert, mentating appropriately, no facial asymmetry. gross sensation, motor, coordination are intact  PSYCH: Affect appropriate

## 2021-02-06 NOTE — ED PROVIDER NOTE - PROGRESS NOTE DETAILS
PGY3/MD Courtney. ucg negative, pt feels better, anxious appearance, 's but pt states her HR is always high. not acute pathology, I have given the pt strict return and follow up precautions. The patient has been provided with a copy of all pertinent results. The patient has been informed of all concerning signs and symptoms to return to Emergency Department, the necessity to follow up with PMD/Clinic/follow up provided within 2-3 days was explained, and the patient reports understanding of above with capacity and insight.

## 2021-02-06 NOTE — ED PROVIDER NOTE - CLINICAL SUMMARY MEDICAL DECISION MAKING FREE TEXT BOX
Patient is a 24 yo female with PMH of TMJ and panic disorder who presents with c/o of neck pain and associated Left jaw pain of 3 days duration.    TMJ and pain symptomatic management with tylenol, lidocaine patch, methocarbamol  referral for TMJ specialist

## 2021-02-06 NOTE — ED PROVIDER NOTE - NSFOLLOWUPINSTRUCTIONS_ED_ALL_ED_FT
- Please follow up with TMJ specialist  - take robaxin as prescribed    1) Take 400-600mg Ibuprofen (also called Advil or Mortrin) every 6 hours as needed for fever/pain/discomfort. You can take this with Tylenol 650-1000 mg (also called acetaminophen) every 6 hours. You can take these medications 3 hrs apart in order to have a layered effect. Don't use more than 4000 mg of Tylenol in any 24-hour period. Make sure your other prescription/over-the-counter medications don't contain any Tylenol so you don't take too much. If you have any stomach discomfort while taking Motrin, you can use TUMS or Pepcid or Zantac (these can all be bought without a prescription). Please do not take these medications if you do no have pain or if you have any history of bleeding disorders, kidney or liver disease. Do not use ibuprofen if you are on blood thinners (anti-coagulation).  2) If your pain is NOT well controlled with these medications, take pain medication as prescribed.  3) Drink at least 2 Liters or 64 Ounces of water each day.

## 2021-02-06 NOTE — ED PROVIDER NOTE - ATTENDING CONTRIBUTION TO CARE
26 y/o F with L sided neck and jaw pain.  She reports several days of worsening sxs.  She has been recently evaluated by her dentist and diagnosed with TMJ.  She is currently pending fitting for .  She denies associated swelling, rash, ha, fever, vision changes, drooling, michael, cp, palpitations, weakness, numbness.  No trauma.  Pt did not take any meds prior to arrival.  Well appearing, lying comfortably in stretcher, awake and alert, nontoxic.  VSS.  NCAT EOMI PERRL.  Mild L tmj tenderness but no malocclusion or trismus.  Neck is supple, no midline tenderness, (+)ttp along left scm and trapezius muscles.  No neuro deficits pt with FROM strength and sensation to bilat upper ext.  Likely msk pain 2/2 known tmj.  Will check ucg, nsaids, lidoderm, outpatient follow-up.

## 2021-02-06 NOTE — ED ADULT TRIAGE NOTE - CHIEF COMPLAINT QUOTE
pt arrives w/ c/o neck pain x 3 days. pt states she went to dentist and was told she has TMJ. pt states sometimes the pain makes her nauseous. pt denies any fever, cough, chest pain. PMH Anxiety , depression

## 2021-02-06 NOTE — ED PROVIDER NOTE - OBJECTIVE STATEMENT
Patient is a 24 yo female with PMH of TMJ and panic disorder who presents with c/o of neck pain and associated Left jaw pain of 3 days duration. Patient indicates that the pain is located in her left jaw, left shoulder, and back of neck. Patient states that she took Tylenol 2 hours ago, and reports that it didn't seem to reduce her pain, which is currently 5/10 severity.  Patient states that she first began experiencing symptoms of TMJ about a month ago (jaw pain, popping of jaw, ear pain, and headaches, and locking of jaw) states she went to dentist and was told she has TMJ. Pt denies any fever, chills, vomiting, cough, chest pain. Patient denies visual changes, denies head trauma, denies MV accidents, denies activities involving neck hyperextension. Patient denies visual changes, denies hearing changes.     Pharmacy: [facility] [phone] Patient is a 24 yo female with PMH of TMJ and panic disorder who presents with c/o of neck pain and associated Left jaw pain of 3 days duration. Patient indicates that the pain is located in her left jaw, left shoulder, and back of neck. Patient states that she took Tylenol 2 hours ago, and reports that it didn't seem to reduce her pain, which is currently 5/10 severity. Patient reports that the pain is worse at night. Patient states that she first began experiencing symptoms of TMJ about a month ago (jaw pain, popping of jaw, ear pain, and headaches, and locking of jaw) states she went to dentist and was told she has TMJ. Pt denies any fever, chills, vomiting, cough, chest pain. Patient denies visual changes, denies head trauma, denies MV accidents, denies activities involving neck hyperextension. Patient denies visual changes, denies hearing changes.     Pharmacy: [facility] [phone] Patient is a 24 yo female with PMH of TMJ and panic disorder who presents with c/o of neck pain and associated Left jaw pain of 3 days duration. Patient states that she first began experiencing symptoms of TMJ about a month ago (jaw pain, popping of jaw, ear pain, and headaches, and locking of jaw) states she went to dentist and was told she has TMJ. Patient indicates that the current pain is located in her left jaw, left shoulder, and back of neck. Patient states that she took Tylenol 2 hours ago, and reports that it didn't seem to reduce her pain, which is currently 5/10 severity and has progressively worsened over the past 3 days. Patient reports that the pain is worse at night. Pt denies any fever, chills, vomiting, cough, chest pain. Patient denies visual changes, denies head trauma, denies MV accidents, denies activities involving neck hyperextension. Patient denies visual changes, denies hearing changes.     Pharmacy: [facility] [phone]

## 2021-02-06 NOTE — ED PROVIDER NOTE - NS ED ROS FT
CONSTITUTIONAL: No fevers, no chills  ENT: no throat pain  NECK: No pain, no swollen lymph nodes  RESPIRATORY: no increase work of breathing, no shortness of breath.  CARDIOVASCULAR: No chest pain, no palpitations.  GASTROINTESTINAL: No abdominal pain. No diarrhea, no constipation.   GENITOURINARY: No dysuria, no frequency/  NEUROLOGICAL: No numbness, no weakness,   MSK: No joint pain, No decrease ROM,   SKIN: No itching, no rash.

## 2021-02-06 NOTE — ED PROVIDER NOTE - PATIENT PORTAL LINK FT
You can access the FollowMyHealth Patient Portal offered by [facility] by registering at the following website: [email or web address]. By joining [facility]’s FollowMyHealth portal, you will also be able to view your health information using other applications (apps) compatible with our system.

## 2021-04-05 ENCOUNTER — EMERGENCY (EMERGENCY)
Facility: HOSPITAL | Age: 26
LOS: 1 days | Discharge: HOME | End: 2021-04-05
Attending: EMERGENCY MEDICINE | Admitting: EMERGENCY MEDICINE
Payer: COMMERCIAL

## 2021-04-05 VITALS
OXYGEN SATURATION: 100 % | DIASTOLIC BLOOD PRESSURE: 82 MMHG | SYSTOLIC BLOOD PRESSURE: 117 MMHG | TEMPERATURE: 99 F | RESPIRATION RATE: 16 BRPM | HEART RATE: 87 BPM

## 2021-04-05 VITALS
TEMPERATURE: 98 F | RESPIRATION RATE: 14 BRPM | HEART RATE: 98 BPM | SYSTOLIC BLOOD PRESSURE: 115 MMHG | HEIGHT: 61 IN | DIASTOLIC BLOOD PRESSURE: 80 MMHG

## 2021-04-05 PROCEDURE — 99285 EMERGENCY DEPT VISIT HI MDM: CPT | Mod: 25

## 2021-04-05 PROCEDURE — 93010 ELECTROCARDIOGRAM REPORT: CPT

## 2021-04-05 RX ORDER — ONDANSETRON 8 MG/1
4 TABLET, FILM COATED ORAL ONCE
Refills: 0 | Status: COMPLETED | OUTPATIENT
Start: 2021-04-05 | End: 2021-04-05

## 2021-04-05 NOTE — ED PROVIDER NOTE - NS ED ROS FT
Constitution: No Fever or chills, No Weight Loss,   HEENT: No cough, No Discharge, No Rhinorrhea, No URI symptoms  Cardio: No Chest pain, No Palpitations, No Dyspnea  Resp: No SOB, No Wheezing  GI: (+) epigastric abdominal pain, (+) Nausea, No Vomiting, No Constipation, No Diarrhea  : (+) burning upon urination, trouble urinating, no foul odor from urine; (-) Vaginal Bleeding/Discharge  MSK: No Back pain, No Numbness, No Tingling, No Weakness  Neuro: No Headache, No changes to Vision, No changes to Hearing, Normal Gait  Skin: No rashes, No Bruising, No Swelling

## 2021-04-05 NOTE — ED ADULT NURSE NOTE - NSFALLRSKASSESSDT_ED_ALL_ED
Problem: Safety  Goal: Will remain free from injury  Outcome: PROGRESSING AS EXPECTED  Goal: Will remain free from falls  Outcome: PROGRESSING AS EXPECTED     Problem: Venous Thromboembolism (VTW)/Deep Vein Thrombosis (DVT) Prevention:  Goal: Patient will participate in Venous Thrombosis (VTE)/Deep Vein Thrombosis (DVT)Prevention Measures  Outcome: PROGRESSING AS EXPECTED      05-Apr-2021 23:33

## 2021-04-05 NOTE — ED PROVIDER NOTE - CLINICAL SUMMARY MEDICAL DECISION MAKING FREE TEXT BOX
26 female, no endorsed past medical history. Presents with 3 days of dysuric symptoms, nausea, and new onset epigastric abdominal pain that started today. Previous UA demonstrating cystitis - started on Nitrofur; now endorsing epigastric abdominal discomfort. Exam, presentation, and history concerning for PUD vs. Gastritis; less likely pancreatitis; evaluation is NOT consistent with cholecystitis, appendicitis, diverticulitis, pyelonephritis, torsion. Plan: CBC, CMP, UA, UCx, Lipase, GI Cocktail, Re-eval. VSS, non-toxic. Declines STD testing - denies vaginal bleeding, discharge. R/O Preg.

## 2021-04-05 NOTE — ED PROVIDER NOTE - OBJECTIVE STATEMENT
26 female, no endorsed past medical history. Presents with 3 days of dysuric symptoms, nausea, and new onset epigastric abdominal pain that started today. Pt reports she saw was seen [facility] ([facility] and had a UA demonstrating cystitis - for which was started on Nitrofur. SInce starting the medication she is now endorsing epigastric abdominal discomfort. Denies any fevers, chills, cough, lower abdominal pain, vomiting, diarrhea, constipation, bloody stools, vaginal bleeding, vaginal discharge. LMP: end of March. Declines STD testing at this time.

## 2021-04-05 NOTE — ED PROVIDER NOTE - ATTENDING CONTRIBUTION TO CARE
DR. FINNEY, ATTENDING MD-  I performed a face to face bedside interview with the patient regarding history of present illness, review of symptoms and past medical history. I completed an independent physical exam.  I have discussed the patient's plan of care with the resident.   Documentation as above in the note.    25 y/o female h/o anxiety and recently dx uti last week on nitrofurantoin here with continued dysuria as well as epig discomfort, int cp and sob.  Mild luq ttp no r/g.  Likely gastritis/gerd/anxiety/uti/pregnancy.  Obtain cbc cmp lipase ua ucx ucg cxr ekg give gi cocktail antic dc with pcp and ob/gyn f/u as o/p.

## 2021-04-05 NOTE — ED PROVIDER NOTE - PATIENT PORTAL LINK FT
You can access the FollowMyHealth Patient Portal offered by [facility] by registering at the following website: [email or web address]. By joining [employer]’s FollowMyHealth portal, you will also be able to view your health information using other applications (apps) compatible with our system.

## 2021-04-05 NOTE — ED PROVIDER NOTE - NSFOLLOWUPINSTRUCTIONS_ED_ALL_ED_FT
You were seen and evaluated in the Emergency Department for your abdominal pain. You were evaluated clinically and with laboratory studies.    - CONTINUE TO TAKE YOUR ANTIBIOTICS, AS PRESCRIBED.     At this time your clinical evaluation and history do not demonstrate any acute, life-threatening medical conditions warranting emergent treatment. However, we strongly recommend you follow up with one of our OBGYN and Primary Care consultants (or your own) for further evaluation of your symptoms by calling the following number to make an appointment:    [facility] Gynecology and Obstetrics  Gynceology/OB  [address] [place] [address]  Phone: [phone]    [facility] General Internal Medicine  General Internal Medicine  [address] [place] [id number]  Phone: [phone]    Should you develop new or worsening abdominal pain, fevers, chills, nausea, vomiting, diarrhea, or constipation - please return to the ED for immediate evaluation.     We also strongly encourage you make an appointment with your Primary Care Physician for a comprehensive evaluation of your health.

## 2021-04-05 NOTE — ED ADULT NURSE NOTE - OBJECTIVE STATEMENT
received to intake, A&Ox4, amb. 25y/o female no PMH complaining of lower abd pain that radiates to lower back and midsternal non raidiating chest pain. pt states pain began 2 days ago. also endorsing nausea and urinary burning. denies any other acute complaints. 22G IV placed to left arm. pt awaiting CXR at this time.

## 2021-04-05 NOTE — ED PROVIDER NOTE - PHYSICAL EXAMINATION
GEN - NAD; non-toxic; A+Ox3, speaking full sentences, steady gait   HENT - NC/AT, No visible Ecchymosis, No Abrasions, No Lac/Tears, MMM, no discharge  EYES - EOMI, no conjunctival pallor, no scleral icterus  PULM - CTA B/L,  symmetric breath sounds  CV -  RRR, S1 S2, no murmurs 2+ Pulses B/L UE  GI - (+) Epigastric TTP; soft, no guarding, no rebound, no masses    MSK/EXT- no CVA tenderness; no edema, no gross deformity, warm and well perfused, no calf tenderness/swelling/erythema   SKIN - no rash or bruising  NEUROLOGIC - alert, moving all 4 ext with 5/5 Strength

## 2021-04-05 NOTE — ED ADULT TRIAGE NOTE - CHIEF COMPLAINT QUOTE
pt presents to ED complaining of lower abdominal pain. pt describes sharp epigastric pain radiating to bilateral flank area x 3 days with associated dysuria and nausea. pt was diagnosed with a UTI x a week ago and placed on antibiotics but states that the abdominal pain is new.

## 2021-04-05 NOTE — ED PROVIDER NOTE - PROGRESS NOTE DETAILS
Resident Francois: preliminary evaluation without any acute, actionable pathology. Labs grossly unremarkable, UA negative. CXR without evidence of perf or PNA. Repeat abdominal exam without any focal TTP, rebound, guarding - non-peritoneal. PO challenged, successfully. UPreg Negative. Will discharge with OB/GYN and PMD followup with instruction to continue Nitro as prescribed. UCx collected here, will follow should Cx speciate non-sensitive bacteria and require change in Abx will be called by ED Admin.

## 2021-04-06 LAB
ALBUMIN SERPL ELPH-MCNC: 4.7 G/DL — SIGNIFICANT CHANGE UP (ref 3.3–5)
ALP SERPL-CCNC: 54 U/L — SIGNIFICANT CHANGE UP (ref 40–120)
ALT FLD-CCNC: 12 U/L — SIGNIFICANT CHANGE UP (ref 4–33)
ANION GAP SERPL CALC-SCNC: 13 MMOL/L — SIGNIFICANT CHANGE UP (ref 7–14)
APPEARANCE UR: CLEAR — SIGNIFICANT CHANGE UP
AST SERPL-CCNC: 18 U/L — SIGNIFICANT CHANGE UP (ref 4–32)
BASOPHILS # BLD AUTO: 0.09 K/UL — SIGNIFICANT CHANGE UP (ref 0–0.2)
BASOPHILS NFR BLD AUTO: 0.8 % — SIGNIFICANT CHANGE UP (ref 0–2)
BILIRUB SERPL-MCNC: <0.2 MG/DL — SIGNIFICANT CHANGE UP (ref 0.2–1.2)
BILIRUB UR-MCNC: NEGATIVE — SIGNIFICANT CHANGE UP
BUN SERPL-MCNC: 12 MG/DL — SIGNIFICANT CHANGE UP (ref 7–23)
CALCIUM SERPL-MCNC: 9.3 MG/DL — SIGNIFICANT CHANGE UP (ref 8.4–10.5)
CHLORIDE SERPL-SCNC: 102 MMOL/L — SIGNIFICANT CHANGE UP (ref 98–107)
CO2 SERPL-SCNC: 23 MMOL/L — SIGNIFICANT CHANGE UP (ref 22–31)
COLOR SPEC: SIGNIFICANT CHANGE UP
CREAT SERPL-MCNC: 0.61 MG/DL — SIGNIFICANT CHANGE UP (ref 0.5–1.3)
DIFF PNL FLD: NEGATIVE — SIGNIFICANT CHANGE UP
EOSINOPHIL # BLD AUTO: 0.2 K/UL — SIGNIFICANT CHANGE UP (ref 0–0.5)
EOSINOPHIL NFR BLD AUTO: 1.8 % — SIGNIFICANT CHANGE UP (ref 0–6)
GLUCOSE SERPL-MCNC: 87 MG/DL — SIGNIFICANT CHANGE UP (ref 70–99)
GLUCOSE UR QL: NEGATIVE — SIGNIFICANT CHANGE UP
HCT VFR BLD CALC: 42.2 % — SIGNIFICANT CHANGE UP (ref 34.5–45)
HGB BLD-MCNC: 14 G/DL — SIGNIFICANT CHANGE UP (ref 11.5–15.5)
IANC: 6.78 K/UL — SIGNIFICANT CHANGE UP (ref 1.5–8.5)
IMM GRANULOCYTES NFR BLD AUTO: 0.3 % — SIGNIFICANT CHANGE UP (ref 0–1.5)
KETONES UR-MCNC: ABNORMAL
LEUKOCYTE ESTERASE UR-ACNC: NEGATIVE — SIGNIFICANT CHANGE UP
LIDOCAIN IGE QN: 40 U/L — SIGNIFICANT CHANGE UP (ref 7–60)
LYMPHOCYTES # BLD AUTO: 3.4 K/UL — HIGH (ref 1–3.3)
LYMPHOCYTES # BLD AUTO: 30.4 % — SIGNIFICANT CHANGE UP (ref 13–44)
MCHC RBC-ENTMCNC: 28.5 PG — SIGNIFICANT CHANGE UP (ref 27–34)
MCHC RBC-ENTMCNC: 33.2 GM/DL — SIGNIFICANT CHANGE UP (ref 32–36)
MCV RBC AUTO: 85.8 FL — SIGNIFICANT CHANGE UP (ref 80–100)
MONOCYTES # BLD AUTO: 0.67 K/UL — SIGNIFICANT CHANGE UP (ref 0–0.9)
MONOCYTES NFR BLD AUTO: 6 % — SIGNIFICANT CHANGE UP (ref 2–14)
NEUTROPHILS # BLD AUTO: 6.78 K/UL — SIGNIFICANT CHANGE UP (ref 1.8–7.4)
NEUTROPHILS NFR BLD AUTO: 60.7 % — SIGNIFICANT CHANGE UP (ref 43–77)
NITRITE UR-MCNC: NEGATIVE — SIGNIFICANT CHANGE UP
NRBC # BLD: 0 /100 WBCS — SIGNIFICANT CHANGE UP
NRBC # FLD: 0 K/UL — SIGNIFICANT CHANGE UP
PH UR: 6.5 — SIGNIFICANT CHANGE UP (ref 5–8)
PLATELET # BLD AUTO: 318 K/UL — SIGNIFICANT CHANGE UP (ref 150–400)
POTASSIUM SERPL-MCNC: 3.7 MMOL/L — SIGNIFICANT CHANGE UP (ref 3.5–5.3)
POTASSIUM SERPL-SCNC: 3.7 MMOL/L — SIGNIFICANT CHANGE UP (ref 3.5–5.3)
PROT SERPL-MCNC: 7.8 G/DL — SIGNIFICANT CHANGE UP (ref 6–8.3)
PROT UR-MCNC: NEGATIVE — SIGNIFICANT CHANGE UP
RBC # BLD: 4.92 M/UL — SIGNIFICANT CHANGE UP (ref 3.8–5.2)
RBC # FLD: 12.2 % — SIGNIFICANT CHANGE UP (ref 10.3–14.5)
SODIUM SERPL-SCNC: 138 MMOL/L — SIGNIFICANT CHANGE UP (ref 135–145)
SP GR SPEC: 1.01 — LOW (ref 1.01–1.02)
UROBILINOGEN FLD QL: SIGNIFICANT CHANGE UP
WBC # BLD: 11.17 K/UL — HIGH (ref 3.8–10.5)
WBC # FLD AUTO: 11.17 K/UL — HIGH (ref 3.8–10.5)

## 2021-04-06 PROCEDURE — 71046 X-RAY EXAM CHEST 2 VIEWS: CPT | Mod: 26

## 2021-04-07 LAB
CULTURE RESULTS: SIGNIFICANT CHANGE UP
SPECIMEN SOURCE: SIGNIFICANT CHANGE UP

## 2022-06-13 NOTE — ED ADULT NURSE NOTE - AS TEMP SITE
[Non-Contributory] : Non-contributory [Duration: ___ wks] : duration: [unfilled] weeks [Vaginal] : Vaginal [Normal?] : normal delivery [___ lbs.] : [unfilled] lbs [___ oz.] : [unfilled] oz. [Was child in NICU?] : Child was not in NICU oral

## 2022-12-11 NOTE — ED PROVIDER NOTE - NSFOLLOWUPCLINICSTOKEN_GEN_ALL_ED_FT
[facility] URGENT [facility]  [address] [address]  Phone: [phone]    December 11, 2022        Landon VIKI Jay  [address] [place] [address]          To whom it may concern:    RE: Landon M Jay    Patient was seen and treated today at our clinic and missed work.    Please contact me for questions or concerns.      Sincerely,        SHY Seo CNP   [id number]: || ||00\01||False;[id number]: || ||00\01||False;